# Patient Record
Sex: MALE | Race: WHITE | NOT HISPANIC OR LATINO | Employment: OTHER | URBAN - METROPOLITAN AREA
[De-identification: names, ages, dates, MRNs, and addresses within clinical notes are randomized per-mention and may not be internally consistent; named-entity substitution may affect disease eponyms.]

---

## 2020-10-29 ENCOUNTER — ANCILLARY PROCEDURE (OUTPATIENT)
Dept: GENERAL RADIOLOGY | Facility: CLINIC | Age: 70
End: 2020-10-29
Attending: PHYSICIAN ASSISTANT
Payer: COMMERCIAL

## 2020-10-29 ENCOUNTER — OFFICE VISIT (OUTPATIENT)
Dept: NEUROSURGERY | Facility: CLINIC | Age: 70
End: 2020-10-29
Attending: PHYSICIAN ASSISTANT
Payer: COMMERCIAL

## 2020-10-29 VITALS
WEIGHT: 162.6 LBS | HEIGHT: 70 IN | SYSTOLIC BLOOD PRESSURE: 130 MMHG | DIASTOLIC BLOOD PRESSURE: 78 MMHG | OXYGEN SATURATION: 96 % | TEMPERATURE: 97.3 F | HEART RATE: 74 BPM | BODY MASS INDEX: 23.28 KG/M2

## 2020-10-29 DIAGNOSIS — Z98.1 S/P LUMBAR SPINAL FUSION: Primary | ICD-10-CM

## 2020-10-29 DIAGNOSIS — Z98.1 S/P LUMBAR SPINAL FUSION: ICD-10-CM

## 2020-10-29 PROCEDURE — G0463 HOSPITAL OUTPT CLINIC VISIT: HCPCS

## 2020-10-29 PROCEDURE — 72100 X-RAY EXAM L-S SPINE 2/3 VWS: CPT | Performed by: RADIOLOGY

## 2020-10-29 PROCEDURE — 99214 OFFICE O/P EST MOD 30 MIN: CPT | Performed by: PHYSICIAN ASSISTANT

## 2020-10-29 RX ORDER — CLINDAMYCIN PHOSPHATE 11.9 MG/ML
SOLUTION TOPICAL
COMMUNITY
Start: 2020-08-24

## 2020-10-29 RX ORDER — TESTOSTERONE CYPIONATE 200 MG/ML
INJECTION, SOLUTION INTRAMUSCULAR
COMMUNITY
Start: 2020-10-20

## 2020-10-29 RX ORDER — ATORVASTATIN CALCIUM 10 MG/1
10 TABLET, FILM COATED ORAL DAILY
COMMUNITY
Start: 2020-09-03

## 2020-10-29 RX ORDER — PREDNISONE 5 MG/1
5 TABLET ORAL DAILY
COMMUNITY
Start: 2020-08-20

## 2020-10-29 RX ORDER — SYRINGE-NEEDLE,INSULIN,0.5 ML 28GX1/2"
SYRINGE, EMPTY DISPOSABLE MISCELLANEOUS
COMMUNITY
Start: 2020-07-27

## 2020-10-29 RX ORDER — LANCETS 33 GAUGE
EACH MISCELLANEOUS
COMMUNITY
Start: 2019-03-04

## 2020-10-29 RX ORDER — LISINOPRIL 40 MG/1
40 TABLET ORAL DAILY
COMMUNITY
Start: 2020-08-04

## 2020-10-29 RX ORDER — PREDNISONE 1 MG/1
1 TABLET ORAL DAILY
COMMUNITY
Start: 2020-04-30

## 2020-10-29 RX ORDER — LEFLUNOMIDE 20 MG/1
20 TABLET ORAL DAILY
COMMUNITY
Start: 2020-03-16

## 2020-10-29 RX ORDER — FOLIC ACID 1 MG/1
1000 TABLET ORAL DAILY
COMMUNITY
Start: 2020-08-22

## 2020-10-29 RX ORDER — TADALAFIL 20 MG/1
20 TABLET ORAL PRN
COMMUNITY
Start: 2020-03-05

## 2020-10-29 RX ORDER — BLOOD SUGAR DIAGNOSTIC
STRIP MISCELLANEOUS
COMMUNITY
Start: 2020-09-08

## 2020-10-29 RX ORDER — ALENDRONATE SODIUM 70 MG/1
70 TABLET ORAL DAILY
COMMUNITY
Start: 2019-11-21

## 2020-10-29 RX ORDER — NEEDLES, DISPOSABLE 25GX5/8"
NEEDLE, DISPOSABLE MISCELLANEOUS
COMMUNITY
Start: 2020-10-20

## 2020-10-29 SDOH — HEALTH STABILITY: MENTAL HEALTH: HOW MANY STANDARD DRINKS CONTAINING ALCOHOL DO YOU HAVE ON A TYPICAL DAY?: 1 OR 2

## 2020-10-29 SDOH — HEALTH STABILITY: MENTAL HEALTH: HOW OFTEN DO YOU HAVE A DRINK CONTAINING ALCOHOL?: 4 OR MORE TIMES A WEEK

## 2020-10-29 SDOH — HEALTH STABILITY: MENTAL HEALTH: HOW OFTEN DO YOU HAVE 6 OR MORE DRINKS ON ONE OCCASION?: NEVER

## 2020-10-29 ASSESSMENT — MIFFLIN-ST. JEOR: SCORE: 1518.8

## 2020-10-29 ASSESSMENT — PAIN SCALES - GENERAL: PAINLEVEL: MILD PAIN (2)

## 2020-10-29 NOTE — PROGRESS NOTES
Neurosurgery follow-up    Mr. Morales is a 69-year-known to Dr. Low from multiple prior surgeries most recent being a lumbar 4-5 interbody fusion performed on 3/1/2018.  He had called into clinic about 7 months ago and was having some back pain this is gradually resolved and now it is returning.  He reports pain primarily in the right low back and radiating down his buttocks.  He states it is nowhere near as bad as it was before surgery but it started to bother him more.  He is cut back on his activities because of his pain.  Has not had any recent imaging.  He has been working with a chiropractor but has not helped all that much.    Exam    B/P: 130/78, T: 97.3, P: 74, R: Data Unavailable     Alert and oriented no acute distress  Bilateral lower extremities with 5/5 strength  Reflexes absent patella/ankle  Negative straight leg raise bilaterally  Lumbar spine nontender to palpation  Able to stand on heels and toes  Gait is normal    Imaging    No updated imaging to review    Assessment    Status post L4-5 lumbar fusion      Plan    We will obtain lumbar x-rays follow-up with the patient once we have those results.  If there is no obvious complications on the x-rays may consider starting physical therapy or perhaps obtaining a lumbar MRI.      Total time of 30 minutes was spent with the patient today in face-to-face consultation and coordination regarding the above assessment and plan.

## 2020-10-29 NOTE — LETTER
10/29/2020         RE: Kwame Morales  [address] [address]        Dear Colleague,    Thank you for referring your patient, Kwame Morales, to the [facility] NEUROSURGERY CLINIC [place]. Please see a copy of my visit note below.    Neurosurgery follow-up    Mr. Morales is a 69-year-known to Dr. Low from multiple prior surgeries most recent being a lumbar 4-5 interbody fusion performed on 3/1/2018.  He had called into clinic about 7 months ago and was having some back pain this is gradually resolved and now it is returning.  He reports pain primarily in the right low back and radiating down his buttocks.  He states it is nowhere near as bad as it was before surgery but it started to bother him more.  He is cut back on his activities because of his pain.  Has not had any recent imaging.  He has been working with a chiropractor but has not helped all that much.    Exam    B/P: 130/78, T: 97.3, P: 74, R: Data Unavailable     Alert and oriented no acute distress  Bilateral lower extremities with 5/5 strength  Reflexes absent patella/ankle  Negative straight leg raise bilaterally  Lumbar spine nontender to palpation  Able to stand on heels and toes  Gait is normal    Imaging    No updated imaging to review    Assessment    Status post L4-5 lumbar fusion      Plan    We will obtain lumbar x-rays follow-up with the patient once we have those results.  If there is no obvious complications on the x-rays may consider starting physical therapy or perhaps obtaining a lumbar MRI.      Total time of 30 minutes was spent with the patient today in face-to-face consultation and coordination regarding the above assessment and plan.        Again, thank you for allowing me to participate in the care of your patient.        Sincerely,        Silva Maharaj PA-C

## 2020-10-29 NOTE — NURSING NOTE
"Kwame Morales is a 69 year old male who presents for:  Chief Complaint   Patient presents with     Neurologic Problem     Lumbar Follow UP         Initial Vitals:  /78 (BP Location: Right arm, Patient Position: Sitting, Cuff Size: Adult Regular)   Pulse 74   Temp 97.3  F (36.3  C) (Oral)   Ht 5' 10\" (1.778 m)   Wt 162 lb 9.6 oz (73.8 kg)   SpO2 96%   BMI 23.33 kg/m   Estimated body mass index is 23.33 kg/m  as calculated from the following:    Height as of this encounter: 5' 10\" (1.778 m).    Weight as of this encounter: 162 lb 9.6 oz (73.8 kg).. Body surface area is 1.91 meters squared. BP completed using cuff size: regular  Mild Pain (2)    Nursing Comments: Pain is in both the low and mid back    Thomas Shankar CMA    "

## 2020-10-30 ENCOUNTER — TELEPHONE (OUTPATIENT)
Dept: NEUROSURGERY | Facility: CLINIC | Age: 70
End: 2020-10-30

## 2020-10-30 DIAGNOSIS — Z98.1 S/P LUMBAR SPINAL FUSION: Primary | ICD-10-CM

## 2020-10-30 NOTE — TELEPHONE ENCOUNTER
Per Silva Maharaj PA-C:  Regarding: PT  Please let the patient lumbar fusion x-ray is.his normal and did not show any hardware complication from the surgery that Dr. Low performed.  I would recommend he start with physical therapy for 4 weeks and if not improving we will obtain a lumbar MRI with and without contrast.    Called Pt with the above information.  Pt verbalized understanding and would like a referral to [facility].  Orders placed.

## 2021-06-10 ENCOUNTER — OFFICE VISIT (OUTPATIENT)
Dept: NEUROSURGERY | Facility: CLINIC | Age: 71
End: 2021-06-10
Attending: PHYSICIAN ASSISTANT
Payer: COMMERCIAL

## 2021-06-10 VITALS
BODY MASS INDEX: 21.47 KG/M2 | WEIGHT: 150 LBS | DIASTOLIC BLOOD PRESSURE: 70 MMHG | HEIGHT: 70 IN | SYSTOLIC BLOOD PRESSURE: 128 MMHG | TEMPERATURE: 98.3 F | OXYGEN SATURATION: 97 % | HEART RATE: 74 BPM

## 2021-06-10 DIAGNOSIS — M89.8X1 PAIN OF LEFT SCAPULA: Primary | ICD-10-CM

## 2021-06-10 PROCEDURE — G0463 HOSPITAL OUTPT CLINIC VISIT: HCPCS

## 2021-06-10 PROCEDURE — 99213 OFFICE O/P EST LOW 20 MIN: CPT | Performed by: PHYSICIAN ASSISTANT

## 2021-06-10 ASSESSMENT — PAIN SCALES - GENERAL: PAINLEVEL: MILD PAIN (3)

## 2021-06-10 ASSESSMENT — MIFFLIN-ST. JEOR: SCORE: 1456.65

## 2021-06-10 NOTE — PROGRESS NOTES
Neurosurgery follow-up    Mr. Morales is a 70-year-old male previously underwent lumbar fusion at L4-5 in 2018.  He also just underwent a right knee replacement about 2 weeks ago.  He states last week he has had intermittent pain on the left side under his scapula.  It comes and goes he does not have any radiating or neurologic component.  There is no numbness or weakness in his arms.  He denies any symptoms in his legs.  He had a lumbar MRI April which showed some increased spinal stenosis at L3-4 level above his fusion which is now mild to moderate he denies any symptoms of neurogenic claudication.    Exam    B/P: 128/70, T: 98.3, P: 74, R: Data Unavailable     Alert and oriented no acute distress  Thoracic spine nontender to palpation  No rashes on his skin  Bilateral lower extremities appropriate strength  Right knee is wrapped with postsurgical dressing    Imaging    No new imaging to review    Assessment    Status post lumbar fusion  Left sided infrascapular pain without any radiating or radicular components  Status post right knee replacement      Plan    I recommend patient monitor symptoms try conservative therapy such as heat ice massage and rest and physical therapy if needed.  If his symptoms are not improving he can contact us for further evaluation and possible imaging.      Total time of 15 minutes spent with the patient today in counseling and coordination of care.

## 2021-06-10 NOTE — NURSING NOTE
"Kwame Morales is a 70 year old male who presents for:  Chief Complaint   Patient presents with     Follow Up     Mid back pain        Initial Vitals:  /70   Pulse 74   Temp 98.3  F (36.8  C)   Ht 5' 10\" (1.778 m)   Wt 150 lb (68 kg)   SpO2 97%   BMI 21.52 kg/m   Estimated body mass index is 21.52 kg/m  as calculated from the following:    Height as of this encounter: 5' 10\" (1.778 m).    Weight as of this encounter: 150 lb (68 kg).. Body surface area is 1.83 meters squared. BP completed using cuff size: regular  Mild Pain (3)    Nursing Comments: patient presents for Mid back pain    Davie Kimbrough MA  "

## 2021-06-10 NOTE — LETTER
6/10/2021         RE: Kwame Morales  [address] [address]        Dear Colleague,    Thank you for referring your patient, Kwame Morales, to the [facility]UROSURGERY CLINIC [place]. Please see a copy of my visit note below.    Neurosurgery follow-up    Mr. Morales is a 70-year-old male previously underwent lumbar fusion at L4-5 in 2018.  He also just underwent a right knee replacement about 2 weeks ago.  He states last week he has had intermittent pain on the left side under his scapula.  It comes and goes he does not have any radiating or neurologic component.  There is no numbness or weakness in his arms.  He denies any symptoms in his legs.  He had a lumbar MRI April which showed some increased spinal stenosis at L3-4 level above his fusion which is now mild to moderate he denies any symptoms of neurogenic claudication.    Exam    B/P: 128/70, T: 98.3, P: 74, R: Data Unavailable     Alert and oriented no acute distress  Thoracic spine nontender to palpation  No rashes on his skin  Bilateral lower extremities appropriate strength  Right knee is wrapped with postsurgical dressing    Imaging    No new imaging to review    Assessment    Status post lumbar fusion  Left sided infrascapular pain without any radiating or radicular components  Status post right knee replacement      Plan    I recommend patient monitor symptoms try conservative therapy such as heat ice massage and rest and physical therapy if needed.  If his symptoms are not improving he can contact us for further evaluation and possible imaging.      Total time of 15 minutes spent with the patient today in counseling and coordination of care.        Again, thank you for allowing me to participate in the care of your patient.        Sincerely,        Silva Maharaj PA-C

## 2021-11-11 ENCOUNTER — HOSPITAL ENCOUNTER (EMERGENCY)
Facility: CLINIC | Age: 71
Discharge: HOME | End: 2021-11-11
Attending: EMERGENCY MEDICINE | Admitting: EMERGENCY MEDICINE
Payer: COMMERCIAL

## 2021-11-11 VITALS
TEMPERATURE: 97.6 F | DIASTOLIC BLOOD PRESSURE: 79 MMHG | RESPIRATION RATE: 16 BRPM | OXYGEN SATURATION: 100 % | SYSTOLIC BLOOD PRESSURE: 123 MMHG | HEART RATE: 63 BPM

## 2021-11-11 DIAGNOSIS — N48.33 PRIAPISM, DRUG-INDUCED: ICD-10-CM

## 2021-11-11 PROCEDURE — 99284 EMERGENCY DEPT VISIT MOD MDM: CPT | Mod: 25

## 2021-11-11 PROCEDURE — 99285 EMERGENCY DEPT VISIT HI MDM: CPT | Mod: 25

## 2021-11-11 PROCEDURE — 54235 NJX CORPORA CAVERNOSA RX AGT: CPT

## 2021-11-11 RX ORDER — BUPIVACAINE HYDROCHLORIDE 5 MG/ML
5 INJECTION, SOLUTION PERINEURAL ONCE
Status: DISCONTINUED | OUTPATIENT
Start: 2021-11-11 | End: 2021-11-11 | Stop reason: HOSPADM

## 2021-11-11 RX ORDER — PHENYLEPHRINE HYDROCHLORIDE 10 MG/ML
1000 INJECTION INTRAVENOUS
Status: DISCONTINUED | OUTPATIENT
Start: 2021-11-11 | End: 2021-11-11

## 2021-11-11 NOTE — DISCHARGE INSTRUCTIONS
You should not use your Trimix until you are cleared by urology..  Should you have return of your erection, severe pain in your penis or redness that tracks up your penis or fever, you should return to the emergency department.

## 2021-11-11 NOTE — ED NOTES
Pt is alert and oriented. VSS stable. Discharge instructions reviewed with pt. Pt verbalizes understanding.

## 2021-11-11 NOTE — ED PROVIDER NOTES
History   Chief Complaint:  Prolonged Erection       The history is provided by the patient.      Kwame Morales is a 70 year old male with history of erectile dysfunction, arterial insufficiency, and a venous leak s/p orchiectomy who presents for evaluation of a prolonged erection. Kwame had a trial of Trimex injection at [facility] on 10/5 which caused a prolonged erection and required injection and aspiration. Urology advised his to take a lower dose at home. He injected 0.5 mL as directed last night at 2020 and has had a sustained erection since. He took one Sudafed at 2330 and a second Sudafed at 0330. He called a nurse line this morning who suggested he come to the ED. This was his first attempt at injecting Trimex at home.     Review of Systems   Genitourinary: Positive for penile swelling.   All other systems reviewed and are negative.      Allergies:  Hydroxychloroquine  Amlodipine  Sildenafil  Sulfasalazine  Tramadol    Medications:  Alendronate   Atorvastatin   Chlorthalidone    Folic acid   Leflunomide   Methotrexate   Prednisone   Testosterone cypionate injection     Past Medical History:     Adenomatous colon polyp  Chronic low back pain   Current chronic use of systemic steroids  Essential hypertension  Foraminal stenosis of lumbar region   Hyperlipidemia   Hypogonadism male  Iliotibial band syndrome   Lacunar infarction   Nephrolithiasis     Primary osteoarthritis of right knee   Rheumatoid arthritis   Sensorineural hearing loss of both ears  Subdural hematoma  Type 2 diabetes mellitus with diabetic polyneuropathy    Past Surgical History:    Appendectomy, ruptured   Foot surgery, right   Lasik   Lumbar fusion L4-L5  Lumbar spine surgery, synovial cyst extension   Orchiectomy, right  Total hip arthroplasty, bilateral   Total knee arthroplasty, right     Family History:    Arthritis   Breast cancer   Cataract   COPD   Dementia   DVT/PE  Glaucoma   Pancreatic cancer  Thyroid disorder     Social  History:  Presents unaccompanied  Urology: Dr. Liriano, [facility]     Physical Exam     Patient Vitals for the past 24 hrs:   BP Temp Temp src Pulse Resp SpO2   11/11/21 1100 -- -- -- -- -- 100 %   11/11/21 1045 117/71 -- -- 63 -- --   11/11/21 0724 136/80 97.6  F (36.4  C) Oral 91 16 98 %       Physical Exam  Constitutional: Alert, attentive, GCS 15   Eyes: EOM are normal, anicteric, conjugate gaze  CV: distal extremities warm, well perfused  Chest: Non-labored breathing on RA  GI:  non tender. No distension. No guarding or rebound.    : Julien tumesced though approximately 50% rigid phallus with normal skin color and intact sensation.  Neurological: Alert, attentive, moving all extremities equally.   Skin: Skin is warm and dry.        Emergency Department Course     [facility]    Procedure: Intra-corporal injection    Date/Time: 11/11/2021 11:05 AM  Performed by: Kwame Beck MD  Authorized by: Kwame Beck MD        ANESTHESIA    Anesthesia: Local infiltration  Local Anesthetic:  Bupivacaine 0.5% with epinephrine  Anesthetic Total (mL):  0.25    PROCEDURE   Patient Tolerance:  Patient tolerated the procedure well with no immediate complications  Describe Procedure: In the 2 o'clock position of the left corpora, skin was cleaned with alcohol swabs x3, small wheal of 0.5% Marcaine was injected subcutaneously, and then using a 25-gauge needle inserted until I had blood return and injected 1000 mcg of phenylephrine and 1 mL of normal saline.  Patient had mild oozing from the puncture wound but otherwise tolerated the procedure well.  Another injection was repeated in the same fashion approximately 45 minutes after the first with nearly full detumescence.        Emergency Department Course:  Reviewed:  I reviewed nursing notes, vitals, past medical history and Care Everywhere    Assessments:  0909 I obtained history and examined the patient as noted above.   1015 I rechecked  the patient and explained findings.   1105 Phenylephrine injection.   1137 Patient rechecked and updated.  Re-injected.  1254 Patient rechecked, he is improved.     Consults:  3466 I consulted with Dr. Liriano, [facility], regarding the patient's history and presentation here in the emergency department.    Interventions:  1000 mcg ICI phenylephrine.    Disposition:  The patient was discharged to home.     Impression & Plan     Medical Decision Makin-year-old male past medical history significant for iatrogenic hypogonadism after perforated appendicitis and orchiectomy 40 years ago, ED who was found to have arterial insufficiency as well as a venous leak on penile ultrasound at [facility] where he follows with urology presenting for priapism after he injected Trimix approximately 12 hours prior to arrival.  He has had a persistent erection since that time which is painless suggestive of high flow state.  On exam he reports he is had decrease of approximately 25% rigidity since onset with taking 2 Sudafed at home at 11 PM and 4 AM.  In discussion with urology, Dr. Liriano, at [facility], they recommended 1000 mcg of intracorporeal injection with 1 repeat prior to aspiration, fortunately after 2 injections he had near full detumescence.   I stressed the importance of follow-up with urology should he have mild persistent symptoms and prior to doing another Trimix injection.  Return precautions were reviewed, I recommended continued ice and elevation of his genitals.    Diagnosis:    ICD-10-CM    1. Priapism, drug-induced  N48.33      Kwame Beck MD  [employer]  1:06 PM 21     Scribe Disclosure:  Lynnette GUALLPA, am serving as a scribe at 9:06 AM on 2021 to document services personally performed by Kwame Beck MD based on my observations and the provider's statements to me.              Kwame Beck MD  [phone]

## 2023-03-31 ENCOUNTER — APPOINTMENT (OUTPATIENT)
Dept: GENERAL RADIOLOGY | Facility: CLINIC | Age: 73
End: 2023-03-31
Attending: EMERGENCY MEDICINE
Payer: COMMERCIAL

## 2023-03-31 ENCOUNTER — HOSPITAL ENCOUNTER (EMERGENCY)
Facility: CLINIC | Age: 73
Discharge: HOME | End: 2023-04-01
Attending: EMERGENCY MEDICINE | Admitting: EMERGENCY MEDICINE
Payer: COMMERCIAL

## 2023-03-31 DIAGNOSIS — S73.006A DISLOCATION OF HIP, UNSPECIFIED LATERALITY, INITIAL ENCOUNTER (H): ICD-10-CM

## 2023-03-31 PROCEDURE — 999N000055 HC STATISTIC END TITIAL CO2 MONITORING

## 2023-03-31 PROCEDURE — 250N000011 HC RX IP 250 OP 636: Performed by: EMERGENCY MEDICINE

## 2023-03-31 PROCEDURE — 999N000065 XR HIP RIGHT 2-3 VIEWS

## 2023-03-31 PROCEDURE — 96376 TX/PRO/DX INJ SAME DRUG ADON: CPT

## 2023-03-31 PROCEDURE — 27265 TREAT HIP DISLOCATION: CPT | Mod: RT

## 2023-03-31 PROCEDURE — 99285 EMERGENCY DEPT VISIT HI MDM: CPT | Mod: 25

## 2023-03-31 PROCEDURE — 999N000157 HC STATISTIC RCP TIME EA 10 MIN

## 2023-03-31 PROCEDURE — 73502 X-RAY EXAM HIP UNI 2-3 VIEWS: CPT

## 2023-03-31 PROCEDURE — 96374 THER/PROPH/DIAG INJ IV PUSH: CPT

## 2023-03-31 RX ORDER — PROPOFOL 10 MG/ML
INJECTION, EMULSION INTRAVENOUS
Status: DISCONTINUED
Start: 2023-03-31 | End: 2023-04-01 | Stop reason: HOSPADM

## 2023-03-31 RX ORDER — PROPOFOL 10 MG/ML
INJECTION, EMULSION INTRAVENOUS DAILY PRN
Status: COMPLETED | OUTPATIENT
Start: 2023-03-31 | End: 2023-03-31

## 2023-03-31 RX ORDER — PROPOFOL 10 MG/ML
200 INJECTION, EMULSION INTRAVENOUS ONCE
Status: DISCONTINUED | OUTPATIENT
Start: 2023-03-31 | End: 2023-04-01 | Stop reason: HOSPADM

## 2023-03-31 RX ADMIN — HYDROMORPHONE HYDROCHLORIDE 1 MG: 1 INJECTION, SOLUTION INTRAMUSCULAR; INTRAVENOUS; SUBCUTANEOUS at 21:57

## 2023-03-31 RX ADMIN — PROPOFOL 30 MG: 10 INJECTION, EMULSION INTRAVENOUS at 23:25

## 2023-03-31 RX ADMIN — PROPOFOL 60 MG: 10 INJECTION, EMULSION INTRAVENOUS at 23:32

## 2023-03-31 RX ADMIN — PROPOFOL 50 MG: 10 INJECTION, EMULSION INTRAVENOUS at 23:21

## 2023-03-31 RX ADMIN — PROPOFOL 30 MG: 10 INJECTION, EMULSION INTRAVENOUS at 23:24

## 2023-03-31 RX ADMIN — PROPOFOL 30 MG: 10 INJECTION, EMULSION INTRAVENOUS at 23:34

## 2023-03-31 ASSESSMENT — ACTIVITIES OF DAILY LIVING (ADL)
ADLS_ACUITY_SCORE: 35
ADLS_ACUITY_SCORE: 35

## 2023-04-01 VITALS
OXYGEN SATURATION: 97 % | RESPIRATION RATE: 16 BRPM | SYSTOLIC BLOOD PRESSURE: 134 MMHG | TEMPERATURE: 98 F | BODY MASS INDEX: 20.9 KG/M2 | WEIGHT: 146 LBS | DIASTOLIC BLOOD PRESSURE: 71 MMHG | HEART RATE: 77 BPM | HEIGHT: 70 IN

## 2023-04-01 ASSESSMENT — ACTIVITIES OF DAILY LIVING (ADL): ADLS_ACUITY_SCORE: 35

## 2023-04-01 NOTE — PROGRESS NOTES
An ETCO2 monitor was placed on the pt with 2-3LPM bled in. The Ambu bag, suction, and airways were setup and present in the room, but not needed. Pt was able to maintain airway throughout the procedure with no intervention needed. ETCO2 levels were maintained between 41-47.    RT Kamila on 3/31/2023 at 11:59 PM

## 2023-04-01 NOTE — ED TRIAGE NOTES
Pt has had a total of 4 hip surgeries. Two on each hip. Frequent dislocations. Has been able to reduce without visiting the ER on many occasions. Tonight, right hip dislocated while patient was sitting on the bed and rubbing cream into right hip. Pt unable to reduce at home.

## 2023-04-01 NOTE — ED PROVIDER NOTES
"  History     Chief Complaint:  Hip Pain       HPI   Kwame Morales is a 72 year old male who presents with hip deformity.  He has a history of prosthetic right hip with revision last July.  He states that he was bending over to put cream on his shin when he felt his hip go out.  Wife was able to get to him and get him help.  He denies fall denies any other injury.  He is complaining of isolated right sided hip pain with no weakness or numbness            Allergies:  Hydroxychloroquine  Amlodipine  Sildenafil  Sulfasalazine  Tramadol     Medications:    alendronate (FOSAMAX) 70 MG tablet  Aspirin Buf,CaCarb-MgCarb-MgO, 81 MG TABS  atorvastatin (LIPITOR) 10 MG tablet  BD HYPODERMIC NEEDLE 18G X 1\" MISC  clindamycin (CLEOCIN T) 1 % external solution  folic acid (FOLVITE) 1 MG tablet  leflunomide (ARAVA) 20 MG tablet  lisinopril (ZESTRIL) 40 MG tablet  methotrexate 2.5 MG tablet  Needles & Syringes (1ML TB SYRINGE) MISC  OneTouch Delica Lancets 33G MISC  ONEUCH VERIO IQ test strip  predniSONE (DELTASONE) 1 MG tablet  predniSONE (DELTASONE) 5 MG tablet  tadalafil (CIALIS) 20 MG tablet  testosterone cypionate (DEPOTESTOSTERONE) 200 MG/ML injection        Past Medical History:    No past medical history on file.    Past Surgical History:    No past surgical history on file.     Family History:    family history is not on file.    Social History:   reports that he has quit smoking. He has never used smokeless tobacco. He reports current alcohol use. He reports that he does not use drugs.  PCP: Polina Quinones     Physical Exam     Patient Vitals for the past 24 hrs:   BP Temp Temp src Pulse Resp SpO2 Height Weight   03/31/23 2102 (!) 148/61 98  F (36.7  C) Temporal 76 16 100 % 1.778 m (5' 10\") 66.2 kg (146 lb)        Physical Exam  Constitutional:  Oriented to person, place, and time.  In distress due to pain  HENT:   Head:    Normocephalic.   Mouth/Throat:   Oropharynx is clear and moist.   Eyes:    EOM are normal. " Pupils are equal, round, and reactive to light.   Neck:    Neck supple.   Cardiovascular:  Normal rate, regular rhythm and normal heart sounds.      Exam reveals no gallop and no friction rub.       No murmur heard.  Pulmonary/Chest:  Effort normal and breath sounds normal.      No respiratory distress. No wheezes. No rales.      No reproducible chest wall pain.  Abdominal:   Soft. No distension. No tenderness. No rebound and no guarding.   Musculoskeletal:  2+ distal pulses, right hip deformity is noted with limited range of motion due to pain.  Neurological:   Alert and oriented to person, place, and time.  Strength station is intact right lower extremity.          Moves all 4 extremities spontaneously    Skin:    No rash noted. No pallor.       Emergency Department Course   ECG:  No results found for this or any previous visit.    Imaging:  XR Hip Right 2-3 Views   Final Result   IMPRESSION: Interval successful of the dislocated head to the right femoral stem relative to the acetabular cup. Anatomic alignment without fracture or dislocation. Bilateral hip arthroplasty. Postoperative changes lower lumbar spine. Degenerative    changes lower lumbar spine and both SI joints. Vascular calcifications.      XR Pelvis w Hip Right 1 View   Final Result   IMPRESSION: Interval superolateral dislocation of the head of the right femoral stem relative to the acetabular cup. No fracture. Partially visualized left hip arthroplasty. Postoperative changes lower lumbar spine. Vascular calcifications. The patient    is rotated.         Report per radiology    Laboratory:  Labs Ordered and Resulted from Time of ED Arrival to Time of ED Departure - No data to display     Procedures     Sedation     Procedure: Procedural Sedation    Sedation Level: Deep    Indication: Joint reduction    Consent: Written from Patient     Universal protocol: Universal protocol was followed and time out conducted just prior to starting procedure,  confirming patient identity, site/side, procedure, patient position, and availability of correct equipment and implants.     Last PO Intake: Emergent procedure    ASA Class: Class 1 - A normal healthy patient     Exam:  Mallampati:  Grade 2 - Soft palate and major part of uvula visible   Lungs: Clear   Heart: Regular rate and rhythm     Medication: Propofol  Dose: 90 mg     Monitoring:  Monitoring consisted of heart rate, cardiac, continuous pulse oximetry, frequent blood pressure checks.   There was constant attendance by RN until patient recovered and constant attendance by physician until patient stable.   Intubation and emergency airway equipment available.     Response: Vital signs stable, oxygen saturations greater than 92%       Patient Status: Post procedure patient was alert.     Total Physician Drug Administration / Monitoring Time: 25 minutes.     Patient was monitored during recovery and returned to pre-procedure baseline.       Dislocation Reduction   Procedure: Dislocation Reduction  Consent: Written from Patient  Risks Discussed: Pain, need for repeat attempts, fracture, neurovascular injury, unsuccessful attempts and need to go to OR  Universal Protocol: Universal protocol was followed and time out conducted just prior to starting procedure, confirming patient identity, site/side, procedure, patient position, and availability of correct equipment and implants.    Indication: Dislocated Hip   Location: Right Hip  Anesthesia/Sedation: Sedation: The patient was sedated, see separate procedure note for details.   Procedure Detail: I manipulated the joint including Traction-counter traction    Immobilized with none  Post procedure assessment:  Gross deformity resolved , Neurovascular intact  and ROM improved   Patient Status: The patient tolerated the procedure well: Yes. There were no complications.    Emergency Department Course:             Interventions:  Medications   HYDROmorphone (DILAUDID)  injection 1 mg (1 mg Intravenous $Given 3/31/23 2157)   propofol (DIPRIVAN) injection 10 mg/mL vial (has no administration in time range)        Independent Interpretation (X-rays, CTs, rhythm strip):  X-ray right hip- dislocation no fracture, postreduction x-ray- hip reduced no fracture.    Consultations/Discussion of Management or Tests:             Disposition:  The patient was discharged to home.     Impression & Plan        Medical Decision Making:  Kwame Morales is a 72 year old male who presents for evaluation of right hip pain after bending.  This is consistent by clinical and radiological exam with a hip dislocation of a previous KATH. The dislocation was successfully reduced with procedural sedation.  The neurovascular exam is normal pre and post-reduction.  I will have patient follow-up with orthopedics in 3-5 days.  The head to toe trauma exam is otherwise normal and I doubt serious underlying spinal, intracerebral, chest, abdominal, pelvic or extremity trauma.  Did discuss patient with orthopedics who agrees with plan.  Repeat xray shows no fracture.  Doubt sciatic nerve or other nerve injury, vascular injury, or other complication of dislocation.  Ambulated with KI on and did well.        Diagnosis:    ICD-10-CM    1. Dislocation of hip, unspecified laterality, initial encounter (H)  S73.006A            Discharge Medications:  New Prescriptions    No medications on file        3/31/2023   Kings Kim MD Shapin, Ryan P, MD  04/02/23 0024

## 2023-04-01 NOTE — ED PROVIDER NOTES
Assessment prior to shift change.    72-year-old male with history of revision of right total hip replacement July 2022 presenting to the ER with concerns for right hip dislocation.  At 1930, patient reports bending over to apply ointment to his right shin when he felt a pop in his right hip.  Complains of right hip pain.  He did not fall or have head injury.  He has some minor tingling to the right foot.  He did drink water here in the emergency department but his last p.o. intake was greater than 6 hours prior.    General: the patient is awake and interactive; lying on left side, right hip/knee flexed with right leg shortening  HEENT:  Moist mucous membranes, conjunctiva normal  Pulmonary:  Normal respiratory effort  Cardiovascular:  Well perfused  Musculoskeletal:  Moving 4 extremities grossly wnl, no deformities; DP/PT pulse 2+.  Neuro:  Speech normal, no focal deficits; wiggles toes.  SILT throughout RLE.    72-year-old male with history of right total hip replacement revision July 2022 presenting with right hip pain concerning for right hip dislocation.    Plan:  Pain meds  XR    Patient understands that either myself or my partner will follow up on imaging and add additional testing or imaging as necessary.      Richie Gamble MD  Emergency Medicine           Mavis, Richie Scott MD  03/31/23 [id number]

## 2024-06-19 ENCOUNTER — TELEPHONE (OUTPATIENT)
Dept: NEUROSURGERY | Facility: CLINIC | Age: 74
End: 2024-06-19
Payer: COMMERCIAL

## 2024-06-19 NOTE — TELEPHONE ENCOUNTER
Attempted to reach patient to remind them about appointment scheduled with Silva Maharaj PA-C on 6/20/24 in our [place] location.    A voicemail was left with a call back number if the patient has questions or would like to reschedule.

## 2024-06-20 ENCOUNTER — OFFICE VISIT (OUTPATIENT)
Dept: NEUROSURGERY | Facility: CLINIC | Age: 74
End: 2024-06-20
Payer: COMMERCIAL

## 2024-06-20 VITALS
WEIGHT: 153 LBS | HEART RATE: 62 BPM | BODY MASS INDEX: 21.9 KG/M2 | OXYGEN SATURATION: 98 % | HEIGHT: 70 IN | DIASTOLIC BLOOD PRESSURE: 74 MMHG | SYSTOLIC BLOOD PRESSURE: 121 MMHG

## 2024-06-20 DIAGNOSIS — Z98.1 S/P LUMBAR SPINAL FUSION: Primary | ICD-10-CM

## 2024-06-20 PROCEDURE — 99212 OFFICE O/P EST SF 10 MIN: CPT | Performed by: PHYSICIAN ASSISTANT

## 2024-06-20 ASSESSMENT — PAIN SCALES - GENERAL: PAINLEVEL: NO PAIN (0)

## 2024-06-20 NOTE — NURSING NOTE
"Kwame Morales is a 73 year old male who presents for:  Chief Complaint   Patient presents with    RECHECK     Sharp pain left side down the left leg        Initial Vitals:  /74   Pulse 62   Ht 5' 10\" (1.778 m)   Wt 153 lb (69.4 kg)   SpO2 98%   BMI 21.95 kg/m   Estimated body mass index is 21.95 kg/m  as calculated from the following:    Height as of this encounter: 5' 10\" (1.778 m).    Weight as of this encounter: 153 lb (69.4 kg).. Body surface area is 1.85 meters squared. BP completed using cuff size: regular  No Pain (0)        Giuliana Reid   "

## 2024-06-20 NOTE — LETTER
6/20/2024      Kwame Morales  [address] [facility] [address]      Dear Colleague,    Thank you for referring your patient, Kwame Morales, to the [facility]. Please see a copy of my visit note below.    Neurosurgery follow-up    Mr. Morales is a 73-year-old male who underwent an L4-5 fusion in 2018 with Dr. Low.  He presents to clinic today because he had an episode of left leg radicular pain roughly in an L3 distribution that lasted about 6 days.  It came on without any specific event, and resolved without any particular intervention, he did do some hanging from a bar, which seemed to help with his low back symptoms..    Today he denies any radicular pain weakness numbness or back pain.    Exam    B/P: 121/74, T: Data Unavailable, P: 62, R: Data Unavailable     Alert and oriented no acute distress  Bilateral lower extremities with 5 5 strength  Gait is normal  Lumbar spine nontender to palpation    Imaging    No updated imaging to review    Assessment    Transient episode of left leg radiculopathy in an L3 distribution      Plan    Continue activities as tolerated, and follow-up as needed.  No imaging or intervention required at this time given patient is currently asymptomatic.          Again, thank you for allowing me to participate in the care of your patient.        Sincerely,        Silva Maharaj PA-C

## 2024-06-20 NOTE — PROGRESS NOTES
Neurosurgery follow-up    Mr. Morales is a 73-year-old male who underwent an L4-5 fusion in 2018 with Dr. Low.  He presents to clinic today because he had an episode of left leg radicular pain roughly in an L3 distribution that lasted about 6 days.  It came on without any specific event, and resolved without any particular intervention, he did do some hanging from a bar, which seemed to help with his low back symptoms..    Today he denies any radicular pain weakness numbness or back pain.    Exam    B/P: 121/74, T: Data Unavailable, P: 62, R: Data Unavailable     Alert and oriented no acute distress  Bilateral lower extremities with 5 5 strength  Gait is normal  Lumbar spine nontender to palpation    Imaging    No updated imaging to review    Assessment    Transient episode of left leg radiculopathy in an L3 distribution      Plan    Continue activities as tolerated, and follow-up as needed.  No imaging or intervention required at this time given patient is currently asymptomatic.

## 2025-05-20 ENCOUNTER — APPOINTMENT (OUTPATIENT)
Dept: GENERAL RADIOLOGY | Facility: CLINIC | Age: 75
End: 2025-05-20
Attending: EMERGENCY MEDICINE
Payer: COMMERCIAL

## 2025-05-20 ENCOUNTER — HOSPITAL ENCOUNTER (EMERGENCY)
Facility: CLINIC | Age: 75
Discharge: HOME | End: 2025-05-21
Attending: EMERGENCY MEDICINE | Admitting: EMERGENCY MEDICINE
Payer: COMMERCIAL

## 2025-05-20 VITALS
OXYGEN SATURATION: 96 % | SYSTOLIC BLOOD PRESSURE: 129 MMHG | TEMPERATURE: 98.9 F | DIASTOLIC BLOOD PRESSURE: 73 MMHG | HEART RATE: 76 BPM

## 2025-05-20 DIAGNOSIS — S73.044A: ICD-10-CM

## 2025-05-20 PROCEDURE — 96375 TX/PRO/DX INJ NEW DRUG ADDON: CPT | Mod: 59

## 2025-05-20 PROCEDURE — 250N000011 HC RX IP 250 OP 636: Mod: JZ | Performed by: EMERGENCY MEDICINE

## 2025-05-20 PROCEDURE — 96374 THER/PROPH/DIAG INJ IV PUSH: CPT

## 2025-05-20 PROCEDURE — 27265 TREAT HIP DISLOCATION: CPT | Mod: RT

## 2025-05-20 PROCEDURE — 73502 X-RAY EXAM HIP UNI 2-3 VIEWS: CPT

## 2025-05-20 PROCEDURE — 99285 EMERGENCY DEPT VISIT HI MDM: CPT | Mod: 25

## 2025-05-20 PROCEDURE — 999N000065 XR PELVIS AND HIP RIGHT 1 VIEW

## 2025-05-20 RX ORDER — PROPOFOL 10 MG/ML
1 INJECTION, EMULSION INTRAVENOUS ONCE
Status: COMPLETED | OUTPATIENT
Start: 2025-05-20 | End: 2025-05-20

## 2025-05-20 RX ORDER — HYDROMORPHONE HYDROCHLORIDE 1 MG/ML
0.5 INJECTION, SOLUTION INTRAMUSCULAR; INTRAVENOUS; SUBCUTANEOUS EVERY 30 MIN PRN
Refills: 0 | Status: DISCONTINUED | OUTPATIENT
Start: 2025-05-20 | End: 2025-05-21 | Stop reason: HOSPADM

## 2025-05-20 RX ORDER — FLUMAZENIL 0.1 MG/ML
0.2 INJECTION, SOLUTION INTRAVENOUS
Status: DISCONTINUED | OUTPATIENT
Start: 2025-05-20 | End: 2025-05-21 | Stop reason: HOSPADM

## 2025-05-20 RX ADMIN — HYDROMORPHONE HYDROCHLORIDE 0.5 MG: 1 INJECTION, SOLUTION INTRAMUSCULAR; INTRAVENOUS; SUBCUTANEOUS at 21:06

## 2025-05-20 RX ADMIN — PROPOFOL 100 MG: 10 INJECTION, EMULSION INTRAVENOUS at 22:48

## 2025-05-20 ASSESSMENT — COLUMBIA-SUICIDE SEVERITY RATING SCALE - C-SSRS
6. HAVE YOU EVER DONE ANYTHING, STARTED TO DO ANYTHING, OR PREPARED TO DO ANYTHING TO END YOUR LIFE?: NO
2. HAVE YOU ACTUALLY HAD ANY THOUGHTS OF KILLING YOURSELF IN THE PAST MONTH?: NO
1. IN THE PAST MONTH, HAVE YOU WISHED YOU WERE DEAD OR WISHED YOU COULD GO TO SLEEP AND NOT WAKE UP?: NO

## 2025-05-20 ASSESSMENT — ACTIVITIES OF DAILY LIVING (ADL)
ADLS_ACUITY_SCORE: 42

## 2025-05-21 NOTE — ED NOTES
Patient discharged with right brace in place. Verbalized understanding to follow up with orthopedics before removing brace. Verbalized understanding.

## 2025-05-21 NOTE — SEDATION DOCUMENTATION
Patient tolerated procedure well, alert and orientated, much more comfortable, CMS intact, family at bedside

## 2025-05-21 NOTE — ED TRIAGE NOTES
Pt brought in via ambulance for a possible hip dislocation. Pt has had alissa hip replacements, and he was at a pool game, leaned forward in his chair, and he felt a pop in his right hip and was unable to bear weight. Right leg shortened and externally rotated. 50 mcg of fentanyl given en route.

## 2025-05-21 NOTE — ED PROVIDER NOTES
"    History     Chief Complaint:  Hip Pain       HPI   Kwame Morales is a 74 year old male history of bilateral hip replacements was shooting pool sitting on her chair with grandson just had abandon the movement felt a pop out is right-sided there is pain localized at the hip joint.  No other trauma no other falls not on thinners still has sensation and movement distal to this      Independent Historian:        Review of External Notes:  Looks like [facility] July 2022 for right hip replacement    Medications:    alendronate (FOSAMAX) 70 MG tablet  Aspirin Buf,CaCarb-MgCarb-MgO, 81 MG TABS  atorvastatin (LIPITOR) 10 MG tablet  BD HYPODERMIC NEEDLE 18G X 1\" MISC  clindamycin (CLEOCIN T) 1 % external solution  folic acid (FOLVITE) 1 MG tablet  leflunomide (ARAVA) 20 MG tablet  lisinopril (ZESTRIL) 40 MG tablet  methotrexate 2.5 MG tablet  Needles & Syringes (1ML TB SYRINGE) MISC  OneTouch Delica Lancets 33G MISC  ONEUCH VERIO IQ test strip  predniSONE (DELTASONE) 1 MG tablet  predniSONE (DELTASONE) 5 MG tablet  tadalafil (CIALIS) 20 MG tablet  testosterone cypionate (DEPOTESTOSTERONE) 200 MG/ML injection        Past Medical History:    No past medical history on file.    Past Surgical History:    Past Surgical History:   Procedure Laterality Date    IR LUMBAR PUNCTURE  11/8/2017    IR LUMBAR PUNCTURE  6/8/2017          Physical Exam   Patient Vitals for the past 24 hrs:   BP Temp Temp src Pulse SpO2   05/20/25 2245 136/64 -- -- 66 --   05/20/25 2245 -- -- -- 73 98 %   05/20/25 2240 (!) 143/68 -- -- 83 --   05/20/25 2235 139/73 -- -- 70 96 %   05/20/25 2147 -- -- -- -- 96 %   05/20/25 2132 (!) 155/82 -- -- -- --   05/20/25 2054 -- 98.9  F (37.2  C) Oral -- --   05/20/25 2050 -- -- -- -- 98 %   05/20/25 2049 (!) 182/81 -- -- 74 --        Physical Exam  General: Patient is well appearing. No distress.  Head: Atraumatic.  Eyes: Conjunctivae and EOM are normal. No scleral icterus.  Neck: Normal range of motion. Neck " supple.   Cardiovascular: Normal rate, regular rhythm, normal heart sounds and intact distal pulses.   Pulmonary/Chest: Breath sounds normal. No respiratory distress.  Abdominal: Soft. Bowel sounds are normal. No distension. No tenderness. No rebound or guarding.   Musculoskeletal: Patient is most comfortable with right hip slightly flexed and is externally rotated and shortened pain lateral no spine tenderness no knee tenderness ankle neurovascularly intact distal  Skin: Warm and dry. No rash noted. Not diaphoretic.      Emergency Department Course   ECG      Imaging:  XR Pelvis w Hip Right 1 View   Final Result   IMPRESSION: Postreduction with normal alignment of the right hip arthroplasty. No fracture. Stable left hip arthroplasty.      XR Pelvis w Hip Right 1 View   Final Result   IMPRESSION: Status post bilateral total hip arthroplasties. There is superior displacement of the right femoral component relative to the acetabular component. No definite fracture is identified. Postsurgical and degenerative changes of the lumbar spine.    Osteopenia.          Laboratory:  Labs Ordered and Resulted from Time of ED Arrival to Time of ED Departure - No data to display     Procedures     Sedation     Procedure: Procedural Sedation    Sedation Level: Deep    Indication: Joint reduction    Consent: Written from Patient     Universal protocol: Universal protocol was followed and time out conducted just prior to starting procedure, confirming patient identity, site/side, procedure, patient position, and availability of correct equipment and implants.     Last PO Intake: Emergent procedure    ASA Class: Class 2 - A patient with mild systemic disease     Exam:  Mallampati:  Grade 1 - Soft palate, uvula, and pillars visible    Lungs: Clear   Heart: Regular rate and rhythm     Medication: Propofol  Dose: 100mg    Monitoring:  Monitoring consisted of heart rate, cardiac, continuous pulse oximetry, frequent blood pressure checks.    There was constant attendance by RN until patient recovered and constant attendance by physician until patient stable.   Intubation and emergency airway equipment available.     Response: Vital signs stable, oxygen saturations greater than 92%       Patient Status: Post procedure patient was alert.     Total Physician Drug Administration / Monitoring Time: 10 minutes.     Patient was monitored during recovery and returned to pre-procedure baseline.        Dislocation Reduction   Procedure: Dislocation Reduction  Consent: Written from Patient  Risks Discussed: Pain, need for repeat attempts, fracture, neurovascular injury, unsuccessful attempts, and need to go to OR  Universal Protocol: Universal protocol was followed and time out conducted just prior to starting procedure, confirming patient identity, site/side, procedure, patient position, and availability of correct equipment and implants.      Indication: Dislocated Hip   Location: Right Hip  Anesthesia/Sedation: Sedation: The patient was sedated, see separate procedure note for details.   Procedure Detail: I manipulated the joint including Traction-counter traction    Immobilized with Knee immobilizer   Post procedure assessment:  Gross deformity resolved , Neurovascular intact , and ROM improved   Patient Status: The patient tolerated the procedure well: Yes. There were no complications.   Emergency Department Course & Assessments:    Interventions:  Medications   HYDROmorphone (PF) (DILAUDID) injection 0.5 mg (0.5 mg Intravenous $Given 5/20/25 0686)   flumazenil (ROMAZICON) injection 0.2 mg (has no administration in time range)   propofol (DIPRIVAN) injection 10 mg/mL vial (100 mg Intravenous $Given 5/20/25 5738)        Assessments:      Independent Interpretation (X-rays, CTs, rhythm strip):  Right hip and pelvis superior prosthetic dislocation I appreciate no fractures  Post reduction XR joint is reduced no fracture  Consultations/Discussion of Management  or Tests:         Social Drivers of Health affecting care:       Disposition:  The patient was discharged.    Impression & Plan           Medical Decision Making:  Patient presented with minor mechanism right prosthetic hip dislocation this was easily reduced per the procedure notes above put in a knee immobilizer he has Ortho also given referral for our on-call strict return and follow-up instructions given he has no apparent other injuries he is neurovascularly intact and can bear weight    Diagnosis:    ICD-10-CM    1. Central dislocation of right hip, initial encounter (H)  S73.044A            Discharge Medications:  New Prescriptions    No medications on file            5/20/2025   Jaiden Florian MD Stevens, Andrew C, MD  05/20/25 7963

## 2025-06-20 ENCOUNTER — DOCUMENTATION ONLY (OUTPATIENT)
Dept: EMERGENCY MEDICINE | Facility: CLINIC | Age: 75
End: 2025-06-20
Payer: COMMERCIAL

## 2025-06-20 DIAGNOSIS — S76.111A QUADRICEPS STRAIN, RIGHT, INITIAL ENCOUNTER: Primary | ICD-10-CM
